# Patient Record
(demographics unavailable — no encounter records)

---

## 2025-03-13 NOTE — HISTORY OF PRESENT ILLNESS
[FreeTextEntry1] : BRENDAN AGUILA  is a 82 year old  M Referred from [facility].  He is presurgical for a hip procedure with Dr. VENEGAS     He has a history of atrial fibrillation.  This was initially diagnosed about 10 years ago.  He underwent 2 separate ablation procedures with Dr. Palmer.  He has a loop recorder and no further atrial fibrillation.  He is off anticoagulation.    There is baseline gait instability and uses a cane.   EKG from [place] is sinus rhythm with a right bundle branch block p reoperative blood work hemoglobin 17.4 creatinine 0.8 A1c 5.1   takes aspirin and metoprolol   today's EKG also demonstrates sinus rhythm with right bundle branch block   He smoked in the past.  Retired from [employer].  His father had MI in 70s.

## 2025-03-13 NOTE — HISTORY OF PRESENT ILLNESS
[FreeTextEntry1] : BRENDAN AGUILA  is a 82 year old  M Referred from [facility].  He is presurgical for a hip procedure with Dr. VENEGAS     He has a history of atrial fibrillation.  This was initially diagnosed about 10 years ago.  He underwent 2 separate ablation procedures with Dr. Palmer.  He has a loop recorder and no further atrial fibrillation.  He is off anticoagulation.    There is baseline gait instability and uses a cane.   EKG from [place] is sinus rhythm with a right bundle branch block p reoperative blood work hemoglobin 17.4 creatinine 0.8 A1c 5.1   takes aspirin and metoprolol   today's EKG also demonstrates sinus rhythm with right bundle branch block   He smoked in the past.  Retired from [employer].  His father had MI in 70s.

## 2025-03-13 NOTE — ASSESSMENT
[FreeTextEntry1] : Limited functional status.  Preoperative.  Right bundle branch block.  History of atrial fibrillation.   Recommend echocardiogram and ischemic evaluation.   Pharmacologic stress test due to baseline gait instability.   Further perioperative recommendations based on above.   Discussed long-term risk factor modification for cardiovascular event reduction.   Follow-up EP and loop recorder.

## 2025-03-13 NOTE — ADDENDUM
[FreeTextEntry1] : EKG obtained to assist in the diagnosis and management of assisted problem(s).  Echo demonstrates normal left ventricular function mild valvular heart disease normal pulmonary pressures nuclear stress test no ischemia normal EF inferior wall attenuation above cardiovascular testing has been reviewed Preoperative status ~ 03/05/2025  At present, there are no active cardiac conditions.  No recent unstable coronary syndromes, decompensated heart failure, severe valvular heart disease or significant dysrhythmias.   The clinical benefit of the proposed procedure outweighs the associated cardiovascular risk.   Risk not attenuated with further CV testing.   Prior testing as outlined above. Optimized from a cardiovascular perspective. Continue metoprolol through procedure

## 2025-03-13 NOTE — HISTORY OF PRESENT ILLNESS
[FreeTextEntry1] : BRENDAN AGUILA  is a 82 year old  M Referred from [facility].  He is presurgical for a hip procedure with Dr. VENEGAS     He has a history of atrial fibrillation.  This was initially diagnosed about 10 years ago.  He underwent 2 separate ablation procedures with Dr. Palmer.  He has a loop recorder and no further atrial fibrillation.  He is off anticoagulation.    There is baseline gait instability and uses a cane.   EKG from [place] is sinus rhythm with a right bundle branch block p reoperative blood work hemoglobin 17.4 creatinine 0.8 A1c 5.1   takes aspirin and metoprolol   today's EKG also demonstrates sinus rhythm with right bundle branch block   He smoked in the past.  Retired from [employer].  His father had MI in 70s.

## 2025-06-13 NOTE — HISTORY OF PRESENT ILLNESS
[FreeTextEntry1] : BRENDAN AGUILA  is a 82 year old  M  Last visit was March 2025.  At that time noninvasive testing was recommended.  An echocardiogram has normal left ventricular function mild valvular heart disease normal pulmonary pressures.  Stress test inferior wall defect.  Attenuation.  No ischemia.  Follow-up blood work has been requested.  Screening ultrasound imaging has been requested.  Has a history of atrial fibrillation.  This was initially diagnosed about 10 years ago.  He underwent 2 separate ablation procedures with Dr. Mariano.  He has a loop recorder and no further atrial fibrillation.  He is off anticoagulation.    There is baseline gait instability and uses a cane.   today's EKG also demonstrates sinus rhythm with right bundle branch block   He smoked in the past.  Retired from [employer].  His father had MI in 70s.   History of atrial fibrillation.  Discussed long-term risk factor modification for cardiovascular event reduction.   Follow-up EP and loop recorder.